# Patient Record
Sex: FEMALE | Race: WHITE | Employment: UNEMPLOYED | URBAN - METROPOLITAN AREA
[De-identification: names, ages, dates, MRNs, and addresses within clinical notes are randomized per-mention and may not be internally consistent; named-entity substitution may affect disease eponyms.]

---

## 2019-08-06 ENCOUNTER — HOSPITAL ENCOUNTER (EMERGENCY)
Age: 44
Discharge: HOME | End: 2019-08-06

## 2019-08-06 VITALS
HEART RATE: 74 BPM | SYSTOLIC BLOOD PRESSURE: 152 MMHG | DIASTOLIC BLOOD PRESSURE: 91 MMHG | OXYGEN SATURATION: 100 % | RESPIRATION RATE: 18 BRPM | TEMPERATURE: 98 F

## 2019-08-06 DIAGNOSIS — H10.31 ACUTE CONJUNCTIVITIS OF RIGHT EYE, UNSPECIFIED ACUTE CONJUNCTIVITIS TYPE: Primary | ICD-10-CM

## 2019-08-06 PROCEDURE — 6370000000 HC RX 637 (ALT 250 FOR IP): Performed by: NURSE PRACTITIONER

## 2019-08-06 PROCEDURE — 99282 EMERGENCY DEPT VISIT SF MDM: CPT

## 2019-08-06 RX ORDER — ERYTHROMYCIN 5 MG/G
OINTMENT OPHTHALMIC ONCE
Status: COMPLETED | OUTPATIENT
Start: 2019-08-06 | End: 2019-08-06

## 2019-08-06 RX ORDER — ERYTHROMYCIN 5 MG/G
OINTMENT OPHTHALMIC
Qty: 1 TUBE | Refills: 0 | Status: SHIPPED | OUTPATIENT
Start: 2019-08-06 | End: 2019-08-16

## 2019-08-06 RX ADMIN — ERYTHROMYCIN: 5 OINTMENT OPHTHALMIC at 20:13

## 2019-08-06 ASSESSMENT — VISUAL ACUITY
OD: 20/50
OS: 20/25
OU: 20/25

## 2019-08-07 ASSESSMENT — ENCOUNTER SYMPTOMS
WHEEZING: 0
EYE ITCHING: 1
BACK PAIN: 0
SHORTNESS OF BREATH: 0
EYE PAIN: 1
ABDOMINAL PAIN: 0
EYE DISCHARGE: 0
PHOTOPHOBIA: 0
DIARRHEA: 0
NAUSEA: 0
VOMITING: 0
COUGH: 0
EYE REDNESS: 1
COLOR CHANGE: 0

## 2019-08-20 ENCOUNTER — HOSPITAL ENCOUNTER (EMERGENCY)
Age: 44
Discharge: HOME | End: 2019-08-20
Attending: EMERGENCY MEDICINE

## 2019-08-20 ENCOUNTER — APPOINTMENT (OUTPATIENT)
Dept: GENERAL RADIOLOGY | Age: 44
End: 2019-08-20

## 2019-08-20 VITALS
HEIGHT: 64 IN | TEMPERATURE: 99.5 F | RESPIRATION RATE: 16 BRPM | HEART RATE: 94 BPM | WEIGHT: 248 LBS | DIASTOLIC BLOOD PRESSURE: 92 MMHG | OXYGEN SATURATION: 96 % | SYSTOLIC BLOOD PRESSURE: 135 MMHG | BODY MASS INDEX: 42.34 KG/M2

## 2019-08-20 DIAGNOSIS — J18.9 PNEUMONIA DUE TO ORGANISM: Primary | ICD-10-CM

## 2019-08-20 LAB
A/G RATIO: 1.2 (ref 1.1–2.2)
ALBUMIN SERPL-MCNC: 4.4 G/DL (ref 3.4–5)
ALP BLD-CCNC: 103 U/L (ref 40–129)
ALT SERPL-CCNC: 13 U/L (ref 10–40)
AMORPHOUS: ABNORMAL /HPF
ANION GAP SERPL CALCULATED.3IONS-SCNC: 11 MMOL/L (ref 3–16)
AST SERPL-CCNC: 15 U/L (ref 15–37)
BASOPHILS ABSOLUTE: 0 K/UL (ref 0–0.2)
BASOPHILS RELATIVE PERCENT: 0.6 %
BILIRUB SERPL-MCNC: <0.2 MG/DL (ref 0–1)
BILIRUBIN URINE: NEGATIVE
BLOOD, URINE: ABNORMAL
BUN BLDV-MCNC: 10 MG/DL (ref 7–20)
CALCIUM SERPL-MCNC: 9.6 MG/DL (ref 8.3–10.6)
CHLORIDE BLD-SCNC: 99 MMOL/L (ref 99–110)
CLARITY: CLEAR
CO2: 26 MMOL/L (ref 21–32)
COLOR: YELLOW
CREAT SERPL-MCNC: 0.7 MG/DL (ref 0.6–1.1)
EOSINOPHILS ABSOLUTE: 0 K/UL (ref 0–0.6)
EOSINOPHILS RELATIVE PERCENT: 0.3 %
EPITHELIAL CELLS, UA: ABNORMAL /HPF
GFR AFRICAN AMERICAN: >60
GFR NON-AFRICAN AMERICAN: >60
GLOBULIN: 3.7 G/DL
GLUCOSE BLD-MCNC: 106 MG/DL (ref 70–99)
GLUCOSE URINE: NEGATIVE MG/DL
HCT VFR BLD CALC: 39.2 % (ref 36–48)
HEMOGLOBIN: 13.1 G/DL (ref 12–16)
KETONES, URINE: NEGATIVE MG/DL
LEUKOCYTE ESTERASE, URINE: NEGATIVE
LYMPHOCYTES ABSOLUTE: 1 K/UL (ref 1–5.1)
LYMPHOCYTES RELATIVE PERCENT: 13.6 %
MCH RBC QN AUTO: 28.7 PG (ref 26–34)
MCHC RBC AUTO-ENTMCNC: 33.3 G/DL (ref 31–36)
MCV RBC AUTO: 86.1 FL (ref 80–100)
MICROSCOPIC EXAMINATION: YES
MONOCYTES ABSOLUTE: 0.5 K/UL (ref 0–1.3)
MONOCYTES RELATIVE PERCENT: 6.2 %
NEUTROPHILS ABSOLUTE: 5.9 K/UL (ref 1.7–7.7)
NEUTROPHILS RELATIVE PERCENT: 79.3 %
NITRITE, URINE: NEGATIVE
PDW BLD-RTO: 14.9 % (ref 12.4–15.4)
PH UA: 6.5 (ref 5–8)
PLATELET # BLD: 260 K/UL (ref 135–450)
PMV BLD AUTO: 7.3 FL (ref 5–10.5)
POTASSIUM REFLEX MAGNESIUM: 3.8 MMOL/L (ref 3.5–5.1)
PROTEIN UA: NEGATIVE MG/DL
RAPID INFLUENZA  B AGN: NEGATIVE
RAPID INFLUENZA A AGN: NEGATIVE
RBC # BLD: 4.56 M/UL (ref 4–5.2)
RBC UA: ABNORMAL /HPF (ref 0–2)
SODIUM BLD-SCNC: 136 MMOL/L (ref 136–145)
SPECIFIC GRAVITY UA: <=1.005 (ref 1–1.03)
TOTAL PROTEIN: 8.1 G/DL (ref 6.4–8.2)
URINE TYPE: ABNORMAL
UROBILINOGEN, URINE: 0.2 E.U./DL
WBC # BLD: 7.5 K/UL (ref 4–11)
WBC UA: ABNORMAL /HPF (ref 0–5)

## 2019-08-20 PROCEDURE — 99283 EMERGENCY DEPT VISIT LOW MDM: CPT

## 2019-08-20 PROCEDURE — 87804 INFLUENZA ASSAY W/OPTIC: CPT

## 2019-08-20 PROCEDURE — 81001 URINALYSIS AUTO W/SCOPE: CPT

## 2019-08-20 PROCEDURE — 71046 X-RAY EXAM CHEST 2 VIEWS: CPT

## 2019-08-20 PROCEDURE — 80053 COMPREHEN METABOLIC PANEL: CPT

## 2019-08-20 PROCEDURE — 85025 COMPLETE CBC W/AUTO DIFF WBC: CPT

## 2019-08-20 PROCEDURE — 6370000000 HC RX 637 (ALT 250 FOR IP): Performed by: NURSE PRACTITIONER

## 2019-08-20 RX ORDER — LEVOFLOXACIN 500 MG/1
500 TABLET, FILM COATED ORAL DAILY
Status: DISCONTINUED | OUTPATIENT
Start: 2019-08-20 | End: 2019-08-20 | Stop reason: HOSPADM

## 2019-08-20 RX ORDER — IBUPROFEN 600 MG/1
600 TABLET ORAL ONCE
Status: DISCONTINUED | OUTPATIENT
Start: 2019-08-20 | End: 2019-08-20 | Stop reason: HOSPADM

## 2019-08-20 RX ORDER — LEVOFLOXACIN 500 MG/1
500 TABLET, FILM COATED ORAL DAILY
Status: DISCONTINUED | OUTPATIENT
Start: 2019-08-21 | End: 2019-08-20

## 2019-08-20 RX ORDER — FLUCONAZOLE 150 MG/1
150 TABLET ORAL ONCE
Qty: 1 TABLET | Refills: 1 | Status: SHIPPED | OUTPATIENT
Start: 2019-08-20 | End: 2019-08-20

## 2019-08-20 RX ORDER — LEVOFLOXACIN 500 MG/1
500 TABLET, FILM COATED ORAL DAILY
Qty: 7 TABLET | Refills: 0 | Status: SHIPPED | OUTPATIENT
Start: 2019-08-20 | End: 2019-08-27

## 2019-08-20 RX ORDER — ACETAMINOPHEN 325 MG/1
650 TABLET ORAL ONCE
Status: COMPLETED | OUTPATIENT
Start: 2019-08-20 | End: 2019-08-20

## 2019-08-20 RX ADMIN — ACETAMINOPHEN 650 MG: 325 TABLET ORAL at 19:41

## 2019-08-20 RX ADMIN — LEVOFLOXACIN 500 MG: 500 TABLET, FILM COATED ORAL at 20:58

## 2019-08-20 ASSESSMENT — PAIN SCALES - GENERAL
PAINLEVEL_OUTOF10: 5
PAINLEVEL_OUTOF10: 0

## 2021-01-17 NOTE — ED PROVIDER NOTES
[facility]  DISCHARGE SUMMARY    Patient Name: Jocelyne Lindquist  : 1959  MRN: [id number]    Date of Admission: 2021  3:05 AM  Date of Discharge:  2021  Primary Care Physician: Uzma Duffy PA-C    Consults     Date and Time Order Name Status Description    2021 0746 Inpatient Neurology Consult General Completed           Hospital Course     Presenting Problem:   Seizure (CMS/HCC) [R56.9]  Seizure (CMS/HCC) [R56.9]    Active Hospital Problems    Diagnosis  POA   • Hypocalcemia [E83.51]  Yes   • Vitamin D deficiency [E55.9]  Yes   • Alcohol use [Z72.89]  Yes   • GERD (gastroesophageal reflux disease) [K21.9]  Yes   • Essential hypertension [I10]  Yes   • Mixed hyperlipidemia [E78.2]  Yes   • Tobacco use [Z72.0]  Yes      Resolved Hospital Problems    Diagnosis Date Resolved POA   • **New onset seizure (CMS/HCC) [R56.9] 2021 Yes   • Hypomagnesemia [E83.42] 2021 Yes   • Hypokalemia [E87.6] 2021 Yes          Hospital Course:  Jocelyne Lindquist is a 61 y.o. female with a medical hx significant for hypertension, hyperlipidemia, GERD and tobacco use presented to [facility] for a new onset seizure.     1. New onset seizure   - CT head unrevealing   - MRI brain negative   - EEG negative  - Discussed with Dr. Sharpe. Recommends NO anti-convulsants at this time. Pt advised to abstain from alcohol. [place] driving laws also reviewed with patient and , no driving for 3 months.      2. Hypocalcemia, Vitamin D Deficiency   - Improved  - Pt will be discharged on Calcium + Vitamin D supplement      3. Hypertension  - continue metoprolol      4. Hyperlipidemia  - continue statin     5. GERD  - continue PPI     6. Asthma / COPD  - prn albuterol inhaler     7. Alcohol use  - ethanol WNLon admission  - admits to drinking 2 beers most days   - Pt advised to abstain from alcohol to prevent further seizures      8. Marijuana use  - patient denies    - UDS positive for THC     9. Tobacco use   - 1-2 cigarettes per day   - smoking cessation education provided     10. Hypokalemia, Hypomagnesemia  - Replaced and normalized  - Rx for supplements on DC     Discharge Follow Up Recommendations for outpatient labs/diagnostics:  -PCP Uzma Duffy PA-C in 1 week with repeat BMP, Mg, and Ionized Ca  -[facility] as needed    Day of Discharge     HPI:   Pt seen and examined. RN notes reviewed. No acute events overnight. Pt ate breakfast this morning, states it's the first whole meal she has eaten in a while. No further seizure activity since admission. We discussed her electrolyte abnormalities.     Review of Systems  Gen- No fevers, chills  CV- No chest pain, palpitations  Resp- No cough, dyspnea  GI- No N/V/D, abd pain    Vital Signs:   Temp:  [97.9 °F (36.6 °C)-98.8 °F (37.1 °C)] 98.8 °F (37.1 °C)  Heart Rate:  [67-84] 82  Resp:  [18] 18  BP: (119-139)/(62-76) 119/76     Physical Exam:  Constitutional: No acute distress, awake, alert  HENT: NCAT, mucous membranes moist  Respiratory: Clear to auscultation bilaterally, respiratory effort normal   Cardiovascular: RRR, no murmurs, rubs, or gallops  Gastrointestinal: Positive bowel sounds, soft, nontender, nondistended  Musculoskeletal: No bilateral ankle edema  Psychiatric: Appropriate affect, cooperative  Neurologic: Oriented x 3, no focal deficits, speech clear  Skin: No rashes    Pertinent  and/or Most Recent Results     LAB RESULTS:      Lab 01/17/21  0638 01/16/21  1051 01/16/21  0409   WBC 8.68 8.32 10.47   HEMOGLOBIN 11.7* 11.5* 11.7*   HEMATOCRIT 36.6 34.5 35.3   PLATELETS 430 444 438   NEUTROS ABS  --  6.24 8.63*   IMMATURE GRANS (ABS)  --  0.05 0.07*   LYMPHS ABS  --  1.50 1.10   MONOS ABS  --  0.47 0.54   EOS ABS  --  0.03 0.08   MCV 92.0 91.0 89.8         Lab 01/17/21  0927 01/16/21  2100 01/16/21  1457 01/16/21  1051 01/16/21  0409   SODIUM 135*  --   --  141 142   POTASSIUM 3.7 3.7  --  2.9* 3.5  occurring since earlier this afternoon. States that she has redness to her eye, itching but no discharge. She does report that she wore mascara yesterday and slept in it. She denies wearing contact lenses. She does report her vision seems blurry in the right eye but no loss of vision or pain within her eye. Respiratory: Negative for cough, shortness of breath and wheezing. Cardiovascular: Negative for chest pain. Gastrointestinal: Negative for abdominal pain, diarrhea, nausea and vomiting. Genitourinary: Negative for difficulty urinating and dysuria. Musculoskeletal: Negative for back pain. Skin: Negative for color change. Neurological: Negative for weakness, numbness and headaches. Positives and Pertinent negatives as per HPI. Except as noted above in the ROS, problem specific ROS was completed and is negative. Physical Exam:  Physical Exam   Constitutional: She is oriented to person, place, and time. She appears well-developed and well-nourished. HENT:   Head: Normocephalic. Right Ear: External ear normal.   Left Ear: External ear normal.   Mouth/Throat: Oropharynx is clear and moist.   Eyes: Pupils are equal, round, and reactive to light. Right eye exhibits no discharge. Left eye exhibits no discharge. Pupils are 4 mm round reactive, normal extraocular movement no visible nystagmus. Patient has erythema to the conjunctivae of the right eye. There is no visible drainage. Left eye is unremarkable. No visible foreign bodies. Neck: Normal range of motion. Neck supple. Cardiovascular: Normal rate. Pulmonary/Chest: Effort normal. No respiratory distress. Abdominal: Soft. Musculoskeletal: Normal range of motion. Neurological: She is alert and oriented to person, place, and time. GCS eye subscore is 4. GCS verbal subscore is 5. GCS motor subscore is 6. Skin: Skin is warm. Capillary refill takes less than 2 seconds. She is not diaphoretic. No pallor.    Psychiatric: She   CHLORIDE 102  --   --  103 102   CO2 21.0*  --   --  23.0 27.0   ANION GAP 12.0  --   --  15.0 13.0   BUN 4*  --   --  5* 5*   CREATININE 0.68  --   --  0.62 0.74   GLUCOSE 167*  --   --  91 124*   CALCIUM 6.9*  --   --  7.2* 6.5*   IONIZED CALCIUM  --   --  1.02*  --  0.93*   MAGNESIUM 2.4  --   --   --  0.4*         Lab 01/16/21  0409   TOTAL PROTEIN 6.1   ALBUMIN 3.20*   GLOBULIN 2.9   ALT (SGPT) 12   AST (SGOT) 18   BILIRUBIN 0.3   ALK PHOS 62                     Brief Urine Lab Results  (Last result in the past 365 days)      Color   Clarity   Blood   Leuk Est   Nitrite   Protein   CREAT   Urine HCG        01/16/21 0357 Yellow Cloudy Small (1+) Negative Negative >=300 mg/dL (3+)             Microbiology Results (last 10 days)     Procedure Component Value - Date/Time    COVID PRE-OP / PRE-PROCEDURE SCREENING ORDER (NO ISOLATION) - Swab, Nasopharynx [[id number]]  (Normal) Collected: 01/16/21 0659    Lab Status: Final result Specimen: Swab from Nasopharynx Updated: 01/16/21 0745    Narrative:      The following orders were created for panel order COVID PRE-OP / PRE-PROCEDURE SCREENING ORDER (NO ISOLATION) - Swab, Nasopharynx.  Procedure                               Abnormality         Status                     ---------                               -----------         ------                     COVID-19 and FLU A/B PCR...[[id number]]  Normal              Final result                 Please view results for these tests on the individual orders.    COVID-19 and FLU A/B PCR - Swab, Nasopharynx [[id number]]  (Normal) Collected: 01/16/21 0659    Lab Status: Final result Specimen: Swab from Nasopharynx Updated: 01/16/21 0745     COVID19 Not Detected     Influenza A PCR Not Detected     Influenza B PCR Not Detected    Narrative:      Fact sheet for providers: https://www.fda.gov/media/069441/download    Fact sheet for patients: https://www.fda.gov/media/689500/download    Test performed by PCR.    Urine Culture -  Urine, Urine, Clean Catch [[id number]] Collected: 01/16/21 0357    Lab Status: Final result Specimen: Urine, Clean Catch Updated: 01/17/21 1023     Urine Culture >100,000 CFU/mL Mixed Viola Isolated    Narrative:      Specimen contains mixed organisms of questionable pathogenicity which indicates contamination with commensal viola.  Further identification is unlikely to provide clinically useful information.  Suggest recollection.          Ct Head Without Contrast    Result Date: 1/16/2021  CT Head WO HISTORY: 61-year-old female with new onset seizure in the emergency compartment. TECHNIQUE: Axial unenhanced head CT. Radiation dose reduction techniques included automated exposure control or exposure modulation based on body size. Count of known CT and cardiac nuc med studies performed in previous 12 months: 0. Time of scan: 0445 hours COMPARISON: None. FINDINGS: No intracranial hemorrhage, mass, or infarct. No hydrocephalus or extra-axial fluid collection. There are senescent changes, including volume loss and nonspecific white matter change, but no acute abnormality is seen. The skull base, calvarium, and extracranial soft tissues are normal.     Senescent changes without acute abnormality. Signer Name: Anthony Hilton MD  Signed: 1/16/2021 5:09 AM  Workstation Name: [facility]  [facility]    Mri Brain With & Without Contrast    Result Date: 1/16/2021  MRI Brain WO W INDICATION:  New onset seizure activity and altered mental status. In the emergency Department. Brain MRI: No hemorrhage. No acute stroke. No mass lesion. No significant hydrocephalus. This is a preliminary wet read by Dr. Anthony Hilton at 0637 hours. Final interpretation will be provided by neuroradiology. Please refer to final interpretation for detailed findings and any further recommendations. Final report by neuroradiology:   MRI Brain WO W Comparison: No pertinent prior study Technique: Sagittal, axial and coronal images of  the dictations but occasionally words are mis-transcribed.)    Electronically signed, ROGER Barrera CNP,         ROGER Barrera CNP  08/07/19 0228 the head were obtained using the standard protocol. The patient was given 12 cc of MultiHance. Findings: The diffusion sequence demonstrates no evidence of restricted diffusion to indicate acute infarction. The FLAIR sequence shows the ventricles to be mildly generous in size. Cortical sulci are correspondingly prominent. No extra-axial fluid collections. No significant shift of midline structures. There are scattered areas of FLAIR hyperintensity in the periventricular and subcortical white matter. These are nonspecific but likely represent microvascular disease in this patient. The pituitary gland is within normal limits. The cervicomedullary junction is normal. The 7th and 8th cranial nerve complexes are within normal limits. After the administration of contrast, there is normal enhancement of vascular structures. No abnormal enhancing lesions are seen. Mastoid air cells are clear. Visualized paranasal sinuses are clear. No acute orbital findings.     Impression: 1.  No acute intracranial findings. 2.  No evidence of abnormal enhancement. 3.  Mild cerebral atrophy and scattered areas of FLAIR hyperintensity in the periventricular and subcortical white matter likely representing microvascular disease in this patient. Signer Name: Trace Foreman MD  Signed: 1/16/2021 10:33 AM  Workstation Name: [email or web address]  [facility]    Xr Chest 1 View    Result Date: 1/16/2021   EXAMINATION: XR CHEST, SINGLE VIEW - 01/16/2021  INDICATION: Seizure this morning. Evaluate for aspiration.  COMPARISON: 01/04/2021  FINDINGS: Portable chest reveals cardiac and mediastinal silhouettes within normal limits. Calcified granuloma stable within the right lower lung. Degenerative changes seen within the spine. Pulmonary vascularity is within normal limits. No focal parenchymal opacification present.      No acute cardiopulmonary disease.  DICTATED:   01/16/2021 EDITED/ls :   01/16/2021  This report was finalized on  1/16/2021 1:56 PM by Dr. Amparo Bush MD.                       Plan for Follow-up of Pending Labs/Results: PCP  Pending Labs     Order Current Status    Vitamin D 1,25 Dihydroxy In process        Discharge Details        Discharge Medications      New Medications      Instructions Start Date   calcium carb-cholecalciferol 600-800 MG-UNIT tablet   1 tablet, Oral, 2 Times Daily With Meals      magnesium oxide 400 MG tablet  Commonly known as: MAG-OX   400 mg, Oral, 2 Times Daily      potassium chloride 10 MEQ CR tablet   10 mEq, Oral, 2 Times Daily         Continue These Medications      Instructions Start Date   albuterol sulfate  (90 Base) MCG/ACT inhaler  Commonly known as: PROVENTIL HFA;VENTOLIN HFA;PROAIR HFA   INHALE 2 PUFFS BY MOUTH EVERY 6 HOURS AS NEEDED FOR WHEEZING      cyclobenzaprine 10 MG tablet  Commonly known as: FLEXERIL   10 mg, Oral, 3 Times Daily PRN      metoprolol succinate XL 25 MG 24 hr tablet  Commonly known as: TOPROL-XL   25 mg, Oral, Daily      omeprazole 20 MG capsule  Commonly known as: priLOSEC   Take 1 capsule by mouth once daily      pravastatin 40 MG tablet  Commonly known as: Pravachol   40 mg, Oral, Nightly      triamcinolone 0.1 % ointment  Commonly known as: KENALOG   Topical, 3 Times Daily             Allergies   Allergen Reactions   • Amoxicillin Swelling   • Benazepril Swelling   • Doxycycline Diarrhea   • Eggs Or Egg-Derived Products      Causes cramps   • Fish-Derived Products      Causes severe stomach cramps         Discharge Disposition:  Home or Self Care    Diet:  Hospital:  Diet Order   Procedures   • Diet Regular       Activity:      Restrictions or Other Recommendations:       CODE STATUS:    Code Status and Medical Interventions:   Ordered at: 01/16/21 0531     Level Of Support Discussed With:    Patient     Code Status:    CPR     Medical Interventions (Level of Support Prior to Arrest):    Full       No future appointments.    Additional Instructions  for the Follow-ups that You Need to Schedule     Discharge Follow-up with PCP   As directed       Currently Documented PCP:    Uzma Duffy PA-C    PCP Phone Number:    [phone]     Follow Up Details: 1 week         Discharge Follow-up with Specified Provider: [facility] as needed   As directed      To: [facility] as needed                     Shani Giraldo DO  01/17/21      Time Spent on Discharge:  I spent  45  minutes on this discharge activity which included: face-to-face encounter with the patient, reviewing the data in the system, coordination of the care with the nursing staff as well as consultants, documentation, and entering orders.

## 2021-06-02 ENCOUNTER — HOSPITAL ENCOUNTER (OUTPATIENT)
Age: 46
Setting detail: OBSERVATION
Discharge: HOME | End: 2021-06-04
Attending: EMERGENCY MEDICINE | Admitting: INTERNAL MEDICINE

## 2021-06-02 ENCOUNTER — APPOINTMENT (OUTPATIENT)
Dept: GENERAL RADIOLOGY | Age: 46
End: 2021-06-02

## 2021-06-02 DIAGNOSIS — I10 HYPERTENSION, UNSPECIFIED TYPE: ICD-10-CM

## 2021-06-02 DIAGNOSIS — R07.9 CHEST PAIN, UNSPECIFIED TYPE: Primary | ICD-10-CM

## 2021-06-02 PROBLEM — E66.01 MORBID OBESITY (HCC): Status: ACTIVE | Noted: 2021-06-02

## 2021-06-02 LAB
A/G RATIO: 1.3 (ref 1.1–2.2)
ALBUMIN SERPL-MCNC: 4.3 G/DL (ref 3.4–5)
ALP BLD-CCNC: 93 U/L (ref 40–129)
ALT SERPL-CCNC: 17 U/L (ref 10–40)
ANION GAP SERPL CALCULATED.3IONS-SCNC: 10 MMOL/L (ref 3–16)
AST SERPL-CCNC: 18 U/L (ref 15–37)
BASOPHILS ABSOLUTE: 0.1 K/UL (ref 0–0.2)
BASOPHILS RELATIVE PERCENT: 0.7 %
BILIRUB SERPL-MCNC: <0.2 MG/DL (ref 0–1)
BUN BLDV-MCNC: 9 MG/DL (ref 7–20)
CALCIUM SERPL-MCNC: 10.2 MG/DL (ref 8.3–10.6)
CHLORIDE BLD-SCNC: 100 MMOL/L (ref 99–110)
CO2: 26 MMOL/L (ref 21–32)
CREAT SERPL-MCNC: 0.7 MG/DL (ref 0.6–1.1)
EOSINOPHILS ABSOLUTE: 0.1 K/UL (ref 0–0.6)
EOSINOPHILS RELATIVE PERCENT: 1 %
GFR AFRICAN AMERICAN: >60
GFR NON-AFRICAN AMERICAN: >60
GLOBULIN: 3.4 G/DL
GLUCOSE BLD-MCNC: 99 MG/DL (ref 70–99)
HCG QUALITATIVE: NEGATIVE
HCT VFR BLD CALC: 39.1 % (ref 36–48)
HEMOGLOBIN: 12.9 G/DL (ref 12–16)
LYMPHOCYTES ABSOLUTE: 2.8 K/UL (ref 1–5.1)
LYMPHOCYTES RELATIVE PERCENT: 32.5 %
MCH RBC QN AUTO: 29.2 PG (ref 26–34)
MCHC RBC AUTO-ENTMCNC: 33.1 G/DL (ref 31–36)
MCV RBC AUTO: 88.2 FL (ref 80–100)
MONOCYTES ABSOLUTE: 0.5 K/UL (ref 0–1.3)
MONOCYTES RELATIVE PERCENT: 6.3 %
NEUTROPHILS ABSOLUTE: 5.2 K/UL (ref 1.7–7.7)
NEUTROPHILS RELATIVE PERCENT: 59.5 %
PDW BLD-RTO: 14.6 % (ref 12.4–15.4)
PLATELET # BLD: 297 K/UL (ref 135–450)
PMV BLD AUTO: 8 FL (ref 5–10.5)
POTASSIUM SERPL-SCNC: 3.7 MMOL/L (ref 3.5–5.1)
RBC # BLD: 4.43 M/UL (ref 4–5.2)
SODIUM BLD-SCNC: 136 MMOL/L (ref 136–145)
TOTAL PROTEIN: 7.7 G/DL (ref 6.4–8.2)
TROPONIN: <0.01 NG/ML
WBC # BLD: 8.7 K/UL (ref 4–11)

## 2021-06-02 PROCEDURE — 80053 COMPREHEN METABOLIC PANEL: CPT

## 2021-06-02 PROCEDURE — 84703 CHORIONIC GONADOTROPIN ASSAY: CPT

## 2021-06-02 PROCEDURE — 6370000000 HC RX 637 (ALT 250 FOR IP): Performed by: PHYSICIAN ASSISTANT

## 2021-06-02 PROCEDURE — 99284 EMERGENCY DEPT VISIT MOD MDM: CPT

## 2021-06-02 PROCEDURE — 93005 ELECTROCARDIOGRAM TRACING: CPT | Performed by: EMERGENCY MEDICINE

## 2021-06-02 PROCEDURE — 71046 X-RAY EXAM CHEST 2 VIEWS: CPT

## 2021-06-02 PROCEDURE — 84484 ASSAY OF TROPONIN QUANT: CPT

## 2021-06-02 PROCEDURE — 85025 COMPLETE CBC W/AUTO DIFF WBC: CPT

## 2021-06-02 RX ORDER — ASPIRIN 81 MG/1
324 TABLET, CHEWABLE ORAL ONCE
Status: COMPLETED | OUTPATIENT
Start: 2021-06-02 | End: 2021-06-02

## 2021-06-02 RX ADMIN — ASPIRIN 324 MG: 81 TABLET, CHEWABLE ORAL at 23:04

## 2021-06-02 RX ADMIN — NITROGLYCERIN 1 INCH: 20 OINTMENT TOPICAL at 23:04

## 2021-06-03 ENCOUNTER — APPOINTMENT (OUTPATIENT)
Dept: NUCLEAR MEDICINE | Age: 46
End: 2021-06-03

## 2021-06-03 LAB
ANION GAP SERPL CALCULATED.3IONS-SCNC: 10 MMOL/L (ref 3–16)
BUN BLDV-MCNC: 8 MG/DL (ref 7–20)
CALCIUM SERPL-MCNC: 8.9 MG/DL (ref 8.3–10.6)
CHLORIDE BLD-SCNC: 104 MMOL/L (ref 99–110)
CHOLESTEROL, TOTAL: 196 MG/DL (ref 0–199)
CO2: 24 MMOL/L (ref 21–32)
CREAT SERPL-MCNC: 0.7 MG/DL (ref 0.6–1.1)
EKG ATRIAL RATE: 72 BPM
EKG ATRIAL RATE: 90 BPM
EKG DIAGNOSIS: NORMAL
EKG DIAGNOSIS: NORMAL
EKG P AXIS: 62 DEGREES
EKG P AXIS: 65 DEGREES
EKG P-R INTERVAL: 114 MS
EKG P-R INTERVAL: 118 MS
EKG Q-T INTERVAL: 362 MS
EKG Q-T INTERVAL: 400 MS
EKG QRS DURATION: 70 MS
EKG QRS DURATION: 82 MS
EKG QTC CALCULATION (BAZETT): 438 MS
EKG QTC CALCULATION (BAZETT): 442 MS
EKG R AXIS: 46 DEGREES
EKG R AXIS: 59 DEGREES
EKG T AXIS: 32 DEGREES
EKG T AXIS: 36 DEGREES
EKG VENTRICULAR RATE: 72 BPM
EKG VENTRICULAR RATE: 90 BPM
GFR AFRICAN AMERICAN: >60
GFR NON-AFRICAN AMERICAN: >60
GLUCOSE BLD-MCNC: 102 MG/DL (ref 70–99)
HCT VFR BLD CALC: 36.1 % (ref 36–48)
HDLC SERPL-MCNC: 53 MG/DL (ref 40–60)
HEMOGLOBIN: 12.1 G/DL (ref 12–16)
LDL CHOLESTEROL CALCULATED: 120 MG/DL
LV EF: 60 %
LVEF MODALITY: NORMAL
MCH RBC QN AUTO: 29.5 PG (ref 26–34)
MCHC RBC AUTO-ENTMCNC: 33.5 G/DL (ref 31–36)
MCV RBC AUTO: 87.8 FL (ref 80–100)
PDW BLD-RTO: 14.5 % (ref 12.4–15.4)
PLATELET # BLD: 274 K/UL (ref 135–450)
PMV BLD AUTO: 7.5 FL (ref 5–10.5)
POTASSIUM REFLEX MAGNESIUM: 3.9 MMOL/L (ref 3.5–5.1)
RBC # BLD: 4.11 M/UL (ref 4–5.2)
SODIUM BLD-SCNC: 138 MMOL/L (ref 136–145)
TRIGL SERPL-MCNC: 117 MG/DL (ref 0–150)
TROPONIN: <0.01 NG/ML
TROPONIN: <0.01 NG/ML
VLDLC SERPL CALC-MCNC: 23 MG/DL
WBC # BLD: 7.8 K/UL (ref 4–11)

## 2021-06-03 PROCEDURE — 93010 ELECTROCARDIOGRAM REPORT: CPT | Performed by: INTERNAL MEDICINE

## 2021-06-03 PROCEDURE — 80048 BASIC METABOLIC PNL TOTAL CA: CPT

## 2021-06-03 PROCEDURE — 2580000003 HC RX 258: Performed by: NURSE PRACTITIONER

## 2021-06-03 PROCEDURE — 93017 CV STRESS TEST TRACING ONLY: CPT

## 2021-06-03 PROCEDURE — 85027 COMPLETE CBC AUTOMATED: CPT

## 2021-06-03 PROCEDURE — 3430000000 HC RX DIAGNOSTIC RADIOPHARMACEUTICAL: Performed by: FAMILY MEDICINE

## 2021-06-03 PROCEDURE — 93005 ELECTROCARDIOGRAM TRACING: CPT | Performed by: NURSE PRACTITIONER

## 2021-06-03 PROCEDURE — G0378 HOSPITAL OBSERVATION PER HR: HCPCS

## 2021-06-03 PROCEDURE — A9502 TC99M TETROFOSMIN: HCPCS | Performed by: FAMILY MEDICINE

## 2021-06-03 PROCEDURE — 80061 LIPID PANEL: CPT

## 2021-06-03 PROCEDURE — 6370000000 HC RX 637 (ALT 250 FOR IP): Performed by: NURSE PRACTITIONER

## 2021-06-03 PROCEDURE — 84484 ASSAY OF TROPONIN QUANT: CPT

## 2021-06-03 PROCEDURE — 78452 HT MUSCLE IMAGE SPECT MULT: CPT

## 2021-06-03 RX ORDER — SODIUM CHLORIDE 9 MG/ML
25 INJECTION, SOLUTION INTRAVENOUS PRN
Status: DISCONTINUED | OUTPATIENT
Start: 2021-06-03 | End: 2021-06-04 | Stop reason: HOSPADM

## 2021-06-03 RX ORDER — SODIUM CHLORIDE 9 MG/ML
INJECTION, SOLUTION INTRAVENOUS CONTINUOUS
Status: DISCONTINUED | OUTPATIENT
Start: 2021-06-03 | End: 2021-06-03

## 2021-06-03 RX ORDER — NITROGLYCERIN 0.4 MG/1
0.4 TABLET SUBLINGUAL EVERY 5 MIN PRN
Status: DISCONTINUED | OUTPATIENT
Start: 2021-06-03 | End: 2021-06-04 | Stop reason: HOSPADM

## 2021-06-03 RX ORDER — ACETAMINOPHEN 325 MG/1
650 TABLET ORAL EVERY 6 HOURS PRN
Status: DISCONTINUED | OUTPATIENT
Start: 2021-06-03 | End: 2021-06-04 | Stop reason: HOSPADM

## 2021-06-03 RX ORDER — ONDANSETRON 2 MG/ML
4 INJECTION INTRAMUSCULAR; INTRAVENOUS EVERY 6 HOURS PRN
Status: DISCONTINUED | OUTPATIENT
Start: 2021-06-03 | End: 2021-06-04 | Stop reason: HOSPADM

## 2021-06-03 RX ORDER — SODIUM CHLORIDE 0.9 % (FLUSH) 0.9 %
5-40 SYRINGE (ML) INJECTION PRN
Status: DISCONTINUED | OUTPATIENT
Start: 2021-06-03 | End: 2021-06-04 | Stop reason: HOSPADM

## 2021-06-03 RX ORDER — ASPIRIN 81 MG/1
81 TABLET, CHEWABLE ORAL DAILY
Status: DISCONTINUED | OUTPATIENT
Start: 2021-06-03 | End: 2021-06-04 | Stop reason: HOSPADM

## 2021-06-03 RX ORDER — ACETAMINOPHEN 650 MG/1
650 SUPPOSITORY RECTAL EVERY 6 HOURS PRN
Status: DISCONTINUED | OUTPATIENT
Start: 2021-06-03 | End: 2021-06-04 | Stop reason: HOSPADM

## 2021-06-03 RX ORDER — POLYETHYLENE GLYCOL 3350 17 G/17G
17 POWDER, FOR SOLUTION ORAL DAILY PRN
Status: DISCONTINUED | OUTPATIENT
Start: 2021-06-03 | End: 2021-06-04 | Stop reason: HOSPADM

## 2021-06-03 RX ORDER — ONDANSETRON 4 MG/1
4 TABLET, ORALLY DISINTEGRATING ORAL EVERY 8 HOURS PRN
Status: DISCONTINUED | OUTPATIENT
Start: 2021-06-03 | End: 2021-06-04 | Stop reason: HOSPADM

## 2021-06-03 RX ORDER — ATORVASTATIN CALCIUM 40 MG/1
40 TABLET, FILM COATED ORAL NIGHTLY
Status: DISCONTINUED | OUTPATIENT
Start: 2021-06-03 | End: 2021-06-03

## 2021-06-03 RX ORDER — SODIUM CHLORIDE 0.9 % (FLUSH) 0.9 %
5-40 SYRINGE (ML) INJECTION EVERY 12 HOURS SCHEDULED
Status: DISCONTINUED | OUTPATIENT
Start: 2021-06-03 | End: 2021-06-04 | Stop reason: HOSPADM

## 2021-06-03 RX ADMIN — ACETAMINOPHEN 650 MG: 325 TABLET ORAL at 14:00

## 2021-06-03 RX ADMIN — Medication 10 ML: at 08:46

## 2021-06-03 RX ADMIN — Medication 10 ML: at 20:29

## 2021-06-03 RX ADMIN — TETROFOSMIN 33.2 MILLICURIE: 1.38 INJECTION, POWDER, LYOPHILIZED, FOR SOLUTION INTRAVENOUS at 11:18

## 2021-06-03 RX ADMIN — ASPIRIN 81 MG: 81 TABLET, CHEWABLE ORAL at 08:46

## 2021-06-03 ASSESSMENT — PAIN SCALES - GENERAL
PAINLEVEL_OUTOF10: 0
PAINLEVEL_OUTOF10: 2
PAINLEVEL_OUTOF10: 0

## 2021-06-03 NOTE — ED PROVIDER NOTES
EKG done June 2, 2021 at 2052 shows normal sinus rhythm, rate 90, normal intervals, no STEMI, no prior EKG for comparison     Barrera Lu MD  06/02/21 2134

## 2021-06-03 NOTE — H&P
E-Cigarette/Vaping Use Never User    Start Date     Passive Exposure     Quit Date     Counseling Given     Comments         Family History:      History reviewed. No pertinent family history. REVIEW OF SYSTEMS COMPLETED:   Pertinent positives as noted in the HPI. All other systems reviewed and negative. PHYSICAL EXAM PERFORMED:    BP (!) 173/85   Pulse 87   Temp 97.8 °F (36.6 °C)   Resp 19   Ht 5' 3\" (1.6 m)   Wt 260 lb (117.9 kg)   LMP 05/19/2021   SpO2 98%   BMI 46.06 kg/m²     General appearance:  Pleasant, obese female in no apparent distress, appears stated age and cooperative. HEENT: Pupils equal, round, and reactive to light. Extra ocular muscles intact. Conjunctivae/corneas clear. Neck: Supple, with full range of motion. No jugular venous distention. Trachea midline. Respiratory:  Normal respiratory effort. Clear to auscultation, bilaterally without Rales/Wheezes/Rhonchi. Cardiovascular:  Regular rate and rhythm with normal S1/S2 without murmurs, rubs or gallops. Abdomen: Soft, obese, round non-tender, non-distended with normal bowel sounds. Musculoskeletal:  No clubbing, cyanosis or edema bilaterally. Full range of motion without deformity. Skin: Skin color, texture, turgor normal.  No significant rashes or lesions. Neurologic:  Neurovascularly intact.  Cranial nerves: II-XII intact, grossly non-focal.  Psychiatric:  Alert and oriented, thought content appropriate, normal insight  Capillary Refill: Brisk,3 seconds, normal  Peripheral Pulses: +2 palpable, equal bilaterally     Labs:     Recent Labs     06/02/21 2131   WBC 8.7   HGB 12.9   HCT 39.1        Recent Labs     06/02/21 2131      K 3.7      CO2 26   BUN 9   CREATININE 0.7   CALCIUM 10.2     Recent Labs     06/02/21 2131   AST 18   ALT 17   BILITOT <0.2   ALKPHOS 93     Recent Labs     06/02/21 2131   TROPONINI <0.01     Urinalysis:      Lab Results   Component Value Date    NITRU Negative 08/20/2019 WBCUA 6-10 08/20/2019    RBCUA 3-5 08/20/2019    BLOODU TRACE-INTACT 08/20/2019    SPECGRAV <=1.005 08/20/2019    GLUCOSEU Negative 08/20/2019     Radiology:     CXR: I have reviewed the CXR with the following interpretation: mild bibasiilar subsegmental atelectasis    EKG:  I have reviewed the EKG with the following interpretation: The Ekg interpreted in the absence of a cardiologist shows Normal sinus rhythm, rate 90. Possible Left atrial enlargement. Nonspecific ST and T wave abnormality. Abnormal ECG. No previous ECGs available    XR CHEST (2 VW)   Final Result   Mild bibasilar subsegmental atelectasis. ASSESSMENT:    Active Hospital Problems    Diagnosis Date Noted    Morbid obesity ([facility] [address] [E66.01] 06/02/2021    Chest pain [R07.9] 06/02/2021     PLAN:    Chest pain in setting of no known CAD  -atypical  -serial troponin - initial negative  -EKG w/o ischemic changes  -FLP in am  -NPO after MN  -stress in am  -echo in am  -prn nitro  -tele monitoring  -heart score 2    Morbid obesity  With Body mass index is 46.0 kg/m². Complicating assessment and treatment. Placing patient at risk for multiple co-morbidities as well as early death and contributing to the patient's presentation. Counseled on weight loss    DVT Prophylaxis: Lovenox    Diet: No diet orders on file     Code Status: No Order    PT/OT Eval Status: No indication for need at this time    Dispo - 1-2 days pending clinical improvement     Stella Rose, APRN - CNP    Thank you No primary care provider on file. for the opportunity to be involved in this patient's care.  If you have any questions or concerns please feel free to contact me at [phone].  --------------------------------Dr. Peyton Romero-------------------------------------------

## 2021-06-03 NOTE — PLAN OF CARE
Problem: Pain:  Goal: Pain level will decrease  Description: Pain level will decrease  Outcome: Ongoing  Note: Pt will be satisfied with pain control with nitro and PRN tylenol. Pt uses numeric pain rating scale with reassessments after pain med administration. Will continue to monitor progression throughout shift. Problem: Falls - Risk of:  Goal: Will remain free from falls  Description: Will remain free from falls  Outcome: Ongoing  Note: Pt will remain free from falls throughout hospital stay. Bed in lowest position with wheels locked and side rails 2/4 up. Hourly rounding completed. Patient ambulates safely independently, call light is within reach. Will continue to monitor throughout shift.

## 2021-06-03 NOTE — ED PROVIDER NOTES
I independently examined and evaluated Teressa Gonzalez. All diagnostic, treatment, and disposition decisions were made by myself in conjunction with the DANISHA, Maricarmen Lewis. . For all further details of the patient's emergency department visit, please see their documentation. 53-year-old female presents with left-sided chest pain that began abruptly earlier this evening. She states she is no longer having pain but is concerned because her father  of a heart attack at the age of 48. She denies shortness of breath or nausea. She has never had a cardiac work-up in the past.    Vitals:    21 0045   BP: (!) 148/74   Pulse: 72   Resp: 16   Temp: 97.9 °F (36.6 °C)   SpO2: 97%       On exam here the patient is obese. No edema. Heart rate is regular. No increased work of breathing. Patient's workup appears unremarkable however she does have significant family history. We will admit the patient for further cardiac work-up. XR CHEST (2 VW)   Final Result   Mild bibasilar subsegmental atelectasis.          NM Cardiac Stress Test Nuclear Imaging    (Results Pending)       Results for orders placed or performed during the hospital encounter of 21   CBC Auto Differential   Result Value Ref Range    WBC 8.7 4.0 - 11.0 K/uL    RBC 4.43 4.00 - 5.20 M/uL    Hemoglobin 12.9 12.0 - 16.0 g/dL    Hematocrit 39.1 36.0 - 48.0 %    MCV 88.2 80.0 - 100.0 fL    MCH 29.2 26.0 - 34.0 pg    MCHC 33.1 31.0 - 36.0 g/dL    RDW 14.6 12.4 - 15.4 %    Platelets 527 523 - 888 K/uL    MPV 8.0 5.0 - 10.5 fL    Neutrophils % 59.5 %    Lymphocytes % 32.5 %    Monocytes % 6.3 %    Eosinophils % 1.0 %    Basophils % 0.7 %    Neutrophils Absolute 5.2 1.7 - 7.7 K/uL    Lymphocytes Absolute 2.8 1.0 - 5.1 K/uL    Monocytes Absolute 0.5 0.0 - 1.3 K/uL    Eosinophils Absolute 0.1 0.0 - 0.6 K/uL    Basophils Absolute 0.1 0.0 - 0.2 K/uL   Comprehensive Metabolic Panel   Result Value Ref Range    Sodium 136 136 - 145 mmol/L Potassium 3.7 3.5 - 5.1 mmol/L    Chloride 100 99 - 110 mmol/L    CO2 26 21 - 32 mmol/L    Anion Gap 10 3 - 16    Glucose 99 70 - 99 mg/dL    BUN 9 7 - 20 mg/dL    CREATININE 0.7 0.6 - 1.1 mg/dL    GFR Non-African American >60 >60    GFR African American >60 >60    Calcium 10.2 8.3 - 10.6 mg/dL    Total Protein 7.7 6.4 - 8.2 g/dL    Albumin 4.3 3.4 - 5.0 g/dL    Albumin/Globulin Ratio 1.3 1.1 - 2.2    Total Bilirubin <0.2 0.0 - 1.0 mg/dL    Alkaline Phosphatase 93 40 - 129 U/L    ALT 17 10 - 40 U/L    AST 18 15 - 37 U/L    Globulin 3.4 g/dL   Troponin   Result Value Ref Range    Troponin <0.01 <0.01 ng/mL   HCG Qualitative, Serum   Result Value Ref Range    hCG Qual Negative Detects HCG level >10 MIU/mL   Troponin   Result Value Ref Range    Troponin <0.01 <0.01 ng/mL   EKG 12 Lead   Result Value Ref Range    Ventricular Rate 90 BPM    Atrial Rate 90 BPM    P-R Interval 114 ms    QRS Duration 70 ms    Q-T Interval 362 ms    QTc Calculation (Bazett) 442 ms    P Axis 65 degrees    R Axis 59 degrees    T Axis 36 degrees    Diagnosis       Normal sinus rhythmPossible Left atrial enlargementNonspecific ST and T wave abnormalityAbnormal ECGNo previous ECGs available     EKG Interpretation    Interpreted by emergency department physician    Rhythm: normal sinus   Rate: normal  Axis: normal  Ectopy: none  Conduction: normal  ST Segments: no acute change  T Waves: non specific changes  Q Waves: none    Clinical Impression: no acute changes and non-specific EKG    MD Panchito Horan MD  06/03/21 4930

## 2021-06-03 NOTE — PROGRESS NOTES
Hospitalist Progress Note      PCP: No primary care provider on file. Date of Admission: 2021    Chief Complaint: chest pain    Hospital Course: 54 y.o. female, with PMH of morbid obesity, who presented to [facility] with chest pain. History obtained from the patient and review of EMR. The patient stated around 6 PM she had an abrupt onset of left-sided chest pain. She reported that the pain is substernal and radiates to her left arm. She describes the pain as a sharp, stabbing pain that lasts several seconds. The patient denies any other associated symptoms. She denies any personal history of CAD, but stated she has family history and her father  of an MI at 48. In the emergency room the patient had a negative troponin as well as a nonischemic EKG. She will be admitted for further evaluation and treatment. A stress test and echocardiogram have been ordered for the a.m. Subjective: Chest pain improved this morning. It was sharp yesterday. Completed first part of stress test. Plan for second part tomorrow. Medications:  Reviewed    Infusion Medications    sodium chloride       Scheduled Medications    sodium chloride flush  5-40 mL Intravenous 2 times per day    aspirin  81 mg Oral Daily    enoxaparin  40 mg Subcutaneous Daily     PRN Meds: sodium chloride flush, sodium chloride, ondansetron **OR** ondansetron, acetaminophen **OR** acetaminophen, polyethylene glycol, perflutren lipid microspheres, nitroGLYCERIN      Intake/Output Summary (Last 24 hours) at 6/3/2021 1807  Last data filed at 6/3/2021 1747  Gross per 24 hour   Intake 600 ml   Output 900 ml   Net -300 ml       Physical Exam Performed:    BP (!) 141/97   Pulse 74   Temp 98.8 °F (37.1 °C) (Oral)   Resp 18   Ht 5' 3\" (1.6 m)   Wt 262 lb 1.6 oz (118.9 kg)   LMP 2021   SpO2 97%   BMI 46.43 kg/m²     General appearance: No apparent distress, appears stated age and cooperative.   HEENT: Pupils equal, round, and reactive to light. Conjunctivae/corneas clear. Neck: Supple, with full range of motion. No jugular venous distention. Trachea midline. Respiratory:  Normal respiratory effort. Clear to auscultation, bilaterally without Rales/Wheezes/Rhonchi. Cardiovascular: Regular rate and rhythm with normal S1/S2 without murmurs, rubs or gallops. Abdomen: Soft, non-tender, non-distended with normal bowel sounds. Musculoskeletal: No clubbing, cyanosis or edema bilaterally. Full range of motion without deformity. Skin: Skin color, texture, turgor normal.  No rashes or lesions. Neurologic:  Neurovascularly intact without any focal sensory/motor deficits. Cranial nerves: II-XII intact, grossly non-focal.  Psychiatric: Alert and oriented, thought content appropriate, normal insight  Capillary Refill: Brisk,3 seconds, normal   Peripheral Pulses: +2 palpable, equal bilaterally       Labs:   Recent Labs     06/02/21 2131 06/03/21  0346   WBC 8.7 7.8   HGB 12.9 12.1   HCT 39.1 36.1    274     Recent Labs     06/02/21 2131 06/03/21  0346    138   K 3.7 3.9    104   CO2 26 24   BUN 9 8   CREATININE 0.7 0.7   CALCIUM 10.2 8.9     Recent Labs     06/02/21 2131   AST 18   ALT 17   BILITOT <0.2   ALKPHOS 93     No results for input(s): INR in the last 72 hours. Recent Labs     06/02/21 2131 06/03/21  0113 06/03/21  0346   TROPONINI <0.01 <0.01 <0.01       Urinalysis:      Lab Results   Component Value Date    NITRU Negative 08/20/2019    WBCUA 6-10 08/20/2019    RBCUA 3-5 08/20/2019    BLOODU TRACE-INTACT 08/20/2019    SPECGRAV <=1.005 08/20/2019    GLUCOSEU Negative 08/20/2019       Radiology:  XR CHEST (2 VW)   Final Result   Mild bibasilar subsegmental atelectasis.          NM Cardiac Stress Test Nuclear Imaging    (Results Pending)           Assessment/Plan:    Active Hospital Problems    Diagnosis     Morbid obesity ([facility] [address] [E66.01]     Chest pain [R07.9]      Chest pain in setting of no known CAD  -atypical  -serial troponin - negative  -EKG w/o ischemic changes  -  -NPO after MN  -stress pending  -echo pending  -prn nitro  -tele monitoring     Morbid obesity  With Body mass index is 42.8 kg/m². Complicating assessment and treatment. Placing patient at risk for multiple co-morbidities as well as early death and contributing to the patient's presentation. Counseled on weight loss    DVT Prophylaxis: lovenox  Diet: ADULT DIET; Regular;  No Caffeine  Code Status: Full Code    PT/OT Eval Status: baseline    Dispo - 1-2 days pending Juvenal Corrigan MD

## 2021-06-03 NOTE — ED PROVIDER NOTES
[facility] Emergency Department    CHIEF COMPLAINT  Chest Pain (Patient comes into ED with c/o left sided chest pain that began abruptly. Patient states the pain has subsided since she got here but her father  of an MI at 48. )      [address] [facility]  I have seen and evaluated this patient with my supervising physician, Dr. Kary Mederos. HISTORY OF PRESENT ILLNESS  Sam Hermosillo is a 54 y.o. female who presents to the ED complaining of several hour history of substernal chest pain. Patient brought in by  today for evaluation. Patient reports that pain substernal and appears to be more on the left. She reports that it is a fleeting sharp stabbing pain lasting for several seconds prior to resolving. No obvious aggravating or alleviating factors and pain does not appear to radiate. She reports that over the past several days has had some nonspecific left arm discomfort. No numbness, tingling, weakness of the extremity. No pain with deep breaths. No cough or congestion. No fevers chills. She denies headache, lightheadedness, dizziness or confusion. No neck or jaw pain. No back pain. Denies abdominal pain. No nausea vomiting. No leg pain swelling. She is a non-smoker. Does not seek medical care. Reports history of fatal MI in father at age 48. No other complaints, modifying factors or associated symptoms. Nursing notes reviewed. History reviewed. No pertinent past medical history. Past Surgical History:   Procedure Laterality Date    ECTOPIC PREGNANCY SURGERY       History reviewed. No pertinent family history.   Social History     Socioeconomic History    Marital status:      Spouse name: Not on file    Number of children: Not on file    Years of education: Not on file    Highest education level: Not on file   Occupational History    Not on file   Tobacco Use    Smoking status: Never Smoker    Smokeless tobacco: Never Used   Vaping MIU/mL   EKG 12 Lead   Result Value Ref Range    Ventricular Rate 90 BPM    Atrial Rate 90 BPM    P-R Interval 114 ms    QRS Duration 70 ms    Q-T Interval 362 ms    QTc Calculation (Bazett) 442 ms    P Axis 65 degrees    R Axis 59 degrees    T Axis 36 degrees    Diagnosis       Normal sinus rhythmPossible Left atrial enlargementNonspecific ST and T wave abnormalityAbnormal ECGNo previous ECGs available     I spoke with Dr. Tasha Deras and NP Aaron Castellano. We thoroughly discussed the history, physical exam, laboratory and imaging studies, as well as, emergency department course. Based upon that discussion, we've decided to admit Franci Porras to the hospital for further observation, evaluation and treatment. Final Impression  1. Chest pain, unspecified type    2. Hypertension, unspecified type      Blood pressure (!) 173/85, pulse 87, temperature 97.8 °F (36.6 °C), resp. rate 19, height 5' 3\" (1.6 m), weight 260 lb (117.9 kg), last menstrual period 05/19/2021, SpO2 98 %. DISPOSITION  Patient was admitted to the hospital in stable condition.          [place], [place]  06/03/21 0008

## 2021-06-04 ENCOUNTER — APPOINTMENT (OUTPATIENT)
Dept: NUCLEAR MEDICINE | Age: 46
End: 2021-06-04

## 2021-06-04 VITALS
BODY MASS INDEX: 46.56 KG/M2 | TEMPERATURE: 98.4 F | OXYGEN SATURATION: 98 % | HEIGHT: 63 IN | WEIGHT: 262.8 LBS | RESPIRATION RATE: 20 BRPM | DIASTOLIC BLOOD PRESSURE: 78 MMHG | SYSTOLIC BLOOD PRESSURE: 135 MMHG | HEART RATE: 81 BPM

## 2021-06-04 LAB
ANION GAP SERPL CALCULATED.3IONS-SCNC: 10 MMOL/L (ref 3–16)
BUN BLDV-MCNC: 8 MG/DL (ref 7–20)
CALCIUM SERPL-MCNC: 8.9 MG/DL (ref 8.3–10.6)
CHLORIDE BLD-SCNC: 104 MMOL/L (ref 99–110)
CO2: 22 MMOL/L (ref 21–32)
CREAT SERPL-MCNC: <0.5 MG/DL (ref 0.6–1.1)
GFR AFRICAN AMERICAN: >60
GFR NON-AFRICAN AMERICAN: >60
GLUCOSE BLD-MCNC: 95 MG/DL (ref 70–99)
LV EF: 58 %
LVEF MODALITY: NORMAL
POTASSIUM SERPL-SCNC: 3.9 MMOL/L (ref 3.5–5.1)
SODIUM BLD-SCNC: 136 MMOL/L (ref 136–145)

## 2021-06-04 PROCEDURE — 80048 BASIC METABOLIC PNL TOTAL CA: CPT

## 2021-06-04 PROCEDURE — 3430000000 HC RX DIAGNOSTIC RADIOPHARMACEUTICAL: Performed by: NURSE PRACTITIONER

## 2021-06-04 PROCEDURE — G0378 HOSPITAL OBSERVATION PER HR: HCPCS

## 2021-06-04 PROCEDURE — 36415 COLL VENOUS BLD VENIPUNCTURE: CPT

## 2021-06-04 PROCEDURE — 6370000000 HC RX 637 (ALT 250 FOR IP): Performed by: NURSE PRACTITIONER

## 2021-06-04 PROCEDURE — A9502 TC99M TETROFOSMIN: HCPCS | Performed by: NURSE PRACTITIONER

## 2021-06-04 RX ADMIN — ASPIRIN 81 MG: 81 TABLET, CHEWABLE ORAL at 09:22

## 2021-06-04 RX ADMIN — TETROFOSMIN 32 MILLICURIE: 1.38 INJECTION, POWDER, LYOPHILIZED, FOR SOLUTION INTRAVENOUS at 07:40

## 2021-06-04 ASSESSMENT — PAIN SCALES - GENERAL: PAINLEVEL_OUTOF10: 0

## 2021-06-04 NOTE — PLAN OF CARE
Problem: Pain:  Goal: Pain level will decrease  Description: Pain level will decrease  Outcome: Met This Shift  Pt has denied any chest pain and stated has some back pain but only from laying in bed all day. Pt has not required any pain medication.       Problem: Falls - Risk of:  Goal: Will remain free from falls  Description: Will remain free from falls  Outcome: Met This Shift     Problem: Pain:  Goal: Control of acute pain  Description: Control of acute pain  Outcome: Ongoing  Goal: Control of chronic pain  Description: Control of chronic pain  Outcome: Ongoing

## 2021-06-04 NOTE — DISCHARGE SUMMARY
Hospital Medicine Discharge Summary    Patient ID: Armani Nash      Patient's PCP: No primary care provider on file. Admit Date: 2021     Discharge Date: 2021      Admitting Physician: Jethro Kennedy DO     Discharge Physician: Brenton Arguelles MD     Discharge Diagnoses: Active Hospital Problems    Diagnosis     Morbid obesity ([address] [E66.01]     Chest pain [R07.9]        The patient was seen and examined on day of discharge and this discharge summary is in conjunction with any daily progress note from day of discharge. Hospital Course:     54 y.o. female, with PMH of morbid obesity, who presented to [facility] with chest pain. History obtained from the patient and review of EMR.  The patient stated around 6 PM she had an abrupt onset of left-sided chest pain.  She reported that the pain is substernal and radiates to her left arm.  She describes the pain as a sharp, stabbing pain that lasts several seconds.  The patient denies any other associated symptoms.  She denies any personal history of CAD, but stated she has family history and her father  of an MI at 48.  In the emergency room the patient had a negative troponin as well as a nonischemic EKG.  She will be admitted for further evaluation and treatment.  A stress test and echocardiogram have been ordered for the a.m. Chest pain in setting of no known CAD  -atypical  -serial troponin - negative  -EKG w/o ischemic changes  -  -stress without ischemia  -echo normal  -prn nitro  -tele monitoring     Morbid obesity  With Body mass index is 46.0 kg/m². Complicating assessment and treatment. Placing patient at risk for multiple co-morbidities as well as early death and contributing to the patient's presentation. Counseled on weight loss    Elevated BP with diagnosis of HTN - continue diet and exercise. Follow with a PCP.       Physical Exam Performed:     /78   Pulse 81   Temp 98.4 °F (36.9 °C) (Oral)   Resp 20

## 2021-06-04 NOTE — PLAN OF CARE
Problem: Pain:  Goal: Pain level will decrease  Description: Pain level will decrease  6/4/2021 1353 by Joyce Weaver RN  Outcome: Ongoing  Note: Pt will be satisfied with pain control. Pt uses numeric pain rating scale with reassessments after pain med administration. Will continue to monitor progression throughout shift. Problem: Falls - Risk of:  Goal: Will remain free from falls  Description: Will remain free from falls  6/4/2021 1353 by Joyce Weaver RN  Outcome: Ongoing  Note: Pt will remain free from falls throughout hospital stay. Fall precautions in place, bed alarm on, bed in lowest position with wheels locked and side rails 2/4 up. Room door open and hourly rounding completed. Will continue to monitor throughout shift.

## 2021-06-04 NOTE — PROGRESS NOTES
Patient discharged per order. IV removed with catheter intact, dressing applied and no complications. Tele removed and CMU notified. Discharge instructions given to patient, verbalized understanding. Patient discharged and walked self with all belongings in stable condition to private car.

## 2024-04-24 ENCOUNTER — HOSPITAL ENCOUNTER (EMERGENCY)
Age: 49
Discharge: HOME | End: 2024-04-24

## 2024-04-24 VITALS
DIASTOLIC BLOOD PRESSURE: 88 MMHG | RESPIRATION RATE: 18 BRPM | BODY MASS INDEX: 46.59 KG/M2 | HEART RATE: 76 BPM | WEIGHT: 263 LBS | SYSTOLIC BLOOD PRESSURE: 167 MMHG | TEMPERATURE: 98.9 F | OXYGEN SATURATION: 98 %

## 2024-04-24 DIAGNOSIS — R21 RASH AND OTHER NONSPECIFIC SKIN ERUPTION: Primary | ICD-10-CM

## 2024-04-24 DIAGNOSIS — L30.9 DERMATITIS: ICD-10-CM

## 2024-04-24 PROCEDURE — 99283 EMERGENCY DEPT VISIT LOW MDM: CPT

## 2024-04-24 RX ORDER — DOXYCYCLINE HYCLATE 100 MG
100 TABLET ORAL 2 TIMES DAILY
Qty: 14 TABLET | Refills: 0 | Status: SHIPPED | OUTPATIENT
Start: 2024-04-24 | End: 2024-05-01

## 2024-04-24 RX ORDER — DOXYCYCLINE HYCLATE 100 MG
100 TABLET ORAL 2 TIMES DAILY
Qty: 14 TABLET | Refills: 0 | Status: SHIPPED | OUTPATIENT
Start: 2024-04-24 | End: 2024-04-24 | Stop reason: CLARIF

## 2024-04-24 RX ORDER — TRIAMCINOLONE ACETONIDE 1 MG/G
CREAM TOPICAL
Qty: 30 G | Refills: 0 | Status: SHIPPED | OUTPATIENT
Start: 2024-04-24

## 2024-04-24 RX ORDER — TRIAMCINOLONE ACETONIDE 1 MG/G
CREAM TOPICAL
Qty: 45 G | Refills: 0 | Status: SHIPPED | OUTPATIENT
Start: 2024-04-24 | End: 2024-04-24 | Stop reason: CLARIF

## 2024-04-24 ASSESSMENT — PAIN SCALES - GENERAL: PAINLEVEL_OUTOF10: 0

## 2024-04-24 ASSESSMENT — PAIN - FUNCTIONAL ASSESSMENT: PAIN_FUNCTIONAL_ASSESSMENT: 0-10

## 2024-04-24 NOTE — ED PROVIDER NOTES
[facility]  ED  EMERGENCY DEPARTMENT ENCOUNTER        Pt Name: Patricia Ortez  MRN: [id number]  Birthdate 1975  Date of evaluation: 4/24/2024  Provider: KEIRA MORENO PA-C  PCP: No primary care provider on file.  ED Attending: MD Yvonne      DANISHA. I have evaluated this patient.      CHIEF COMPLAINT:     Chief Complaint   Patient presents with    Rash     Rash on abd, started last Friday, thought she had a mosquito bites and it started to get worse       HISTORY OF PRESENT ILLNESS:      History provided by the patient. No limitations.    Patricia Ortez is a 48 y.o. female who arrives to the ED by private vehicle.  Patient is here with a itchy, erythematous rash to her right abdominal wall that started this past Friday, 4/19.  She states she initially thought it could be a mosquito bite or a spider bite.  Over the course of the last 5 to 6 days the area of redness has spread.  She has not tried anything whatsoever for her symptoms (no topical creams or oral meds like Benadryl).  She is not experiencing any associated pain.  She denies fevers or chills.  She states her  has had cellulitis in the past and because the redness is worsening, she is here concerned about this.  She has not observed any insect bites and is unaware of anything topical that could have caused the rash.    Nursing Notes were reviewed     REVIEW OF SYSTEMS:     Review of Systems  Positives and pertinent negatives as per HPI.      PAST MEDICAL HISTORY:   No past medical history on file.    SURGICAL HISTORY:      Past Surgical History:   Procedure Laterality Date    ECTOPIC PREGNANCY SURGERY         CURRENT MEDICATIONS:       Discharge Medication List as of 4/24/2024 10:41 AM          ALLERGIES:    Keflex [cephalexin]    FAMILY HISTORY:     @[employer]    SOCIAL HISTORY:       Social History     Socioeconomic History    Marital status:    Tobacco Use    Smoking status: Never    Smokeless tobacco: Never   Vaping

## 2024-04-29 ENCOUNTER — OFFICE VISIT (OUTPATIENT)
Dept: PRIMARY CARE CLINIC | Age: 49
End: 2024-04-29

## 2024-04-29 VITALS
DIASTOLIC BLOOD PRESSURE: 88 MMHG | HEIGHT: 63 IN | OXYGEN SATURATION: 97 % | BODY MASS INDEX: 47.13 KG/M2 | HEART RATE: 88 BPM | WEIGHT: 266 LBS | SYSTOLIC BLOOD PRESSURE: 134 MMHG | TEMPERATURE: 98.3 F

## 2024-04-29 DIAGNOSIS — R21 RASH: ICD-10-CM

## 2024-04-29 DIAGNOSIS — I10 HYPERTENSION, UNSPECIFIED TYPE: Primary | ICD-10-CM

## 2024-04-29 PROCEDURE — 3075F SYST BP GE 130 - 139MM HG: CPT

## 2024-04-29 PROCEDURE — 99203 OFFICE O/P NEW LOW 30 MIN: CPT

## 2024-04-29 PROCEDURE — 3079F DIAST BP 80-89 MM HG: CPT

## 2024-04-29 RX ORDER — NORGESTIMATE AND ETHINYL ESTRADIOL 0.25-0.035
1 KIT ORAL DAILY
COMMUNITY
Start: 2023-12-06

## 2024-04-29 RX ORDER — LOSARTAN POTASSIUM 100 MG/1
100 TABLET ORAL DAILY
Qty: 90 TABLET | Refills: 1 | Status: SHIPPED | OUTPATIENT
Start: 2024-04-29

## 2024-04-29 RX ORDER — FLUOROURACIL 50 MG/G
CREAM TOPICAL 2 TIMES DAILY
COMMUNITY
Start: 2022-01-10

## 2024-04-29 RX ORDER — LOSARTAN POTASSIUM 100 MG/1
100 TABLET ORAL DAILY
COMMUNITY
Start: 2023-08-30 | End: 2024-04-29 | Stop reason: SDUPTHER

## 2024-04-29 SDOH — ECONOMIC STABILITY: HOUSING INSECURITY
IN THE LAST 12 MONTHS, WAS THERE A TIME WHEN YOU DID NOT HAVE A STEADY PLACE TO SLEEP OR SLEPT IN A SHELTER (INCLUDING NOW)?: NO

## 2024-04-29 SDOH — ECONOMIC STABILITY: FOOD INSECURITY: WITHIN THE PAST 12 MONTHS, THE FOOD YOU BOUGHT JUST DIDN'T LAST AND YOU DIDN'T HAVE MONEY TO GET MORE.: NEVER TRUE

## 2024-04-29 SDOH — ECONOMIC STABILITY: FOOD INSECURITY: WITHIN THE PAST 12 MONTHS, YOU WORRIED THAT YOUR FOOD WOULD RUN OUT BEFORE YOU GOT MONEY TO BUY MORE.: NEVER TRUE

## 2024-04-29 SDOH — ECONOMIC STABILITY: INCOME INSECURITY: HOW HARD IS IT FOR YOU TO PAY FOR THE VERY BASICS LIKE FOOD, HOUSING, MEDICAL CARE, AND HEATING?: NOT HARD AT ALL

## 2024-04-29 ASSESSMENT — PATIENT HEALTH QUESTIONNAIRE - PHQ9
2. FEELING DOWN, DEPRESSED OR HOPELESS: NOT AT ALL
SUM OF ALL RESPONSES TO PHQ QUESTIONS 1-9: 0
SUM OF ALL RESPONSES TO PHQ9 QUESTIONS 1 & 2: 0
SUM OF ALL RESPONSES TO PHQ QUESTIONS 1-9: 0
SUM OF ALL RESPONSES TO PHQ QUESTIONS 1-9: 0
1. LITTLE INTEREST OR PLEASURE IN DOING THINGS: NOT AT ALL
SUM OF ALL RESPONSES TO PHQ QUESTIONS 1-9: 0

## 2024-04-29 NOTE — PROGRESS NOTES
PROGRESS NOTE   [facility]                                  [address], [address], [facility] [address]         Phone: [phone]    Date of Service:  4/29/2024     Patient ID: Toya Ortez is a 48 y.o. female      Subjective:     CC: Hospital follow-up and establish care    HPI  PMH of HTN     Concerns for herpes zoster like rash  Patient rash started with 1-2 macules on the abdomen on April 19 that slowly spread across her abdomen towards her back.      Patient presented emergency department on 4/24/2024 for pruritic rash on the abdomen.  Patient endorsed increased temperature of the affected area which prompted her to go to emergency department.  No definitive diagnosis made in the emergency department, but patient was given doxycycline and steroid cream.  Patient did not have any associated fevers, or chills.      Patient has tried over-the-counter antifungal, over-the-counter steroid, and Benadryl with no resolution of her symptoms.  Patient is mainly dealing with pruritus and tingling.  Patient has been using steroid cream over the weekend which initially worsened her symptoms but has since been able to alleviate some of the redness of her rash.    Patient still has no associated fever or chills    Patient does note some watery discharge following scratching of her rash    Hypertension  Patient has been taking losartan for 2 to 3 years now with recent increase in dosage of 50 mg to 100 mg.  Patient has been on losartan hydrochlorothiazide in the past has not tolerated.    Preventative  Patient is due for her preventative care of lipids, A1c, and Cologuard.           ROS: Negative or otherwise noted in the HPI      Vitals:    04/29/24 1304   BP: 134/88   Site: Right Upper Arm   Position: Sitting   Cuff Size: Large Adult   Pulse: 88   Temp: 98.3 °F (36.8 °C)   TempSrc: Oral   SpO2: 97%   Weight: 120.7 kg (266 lb)   Height:

## 2024-11-04 ENCOUNTER — OFFICE VISIT (OUTPATIENT)
Dept: INTERNAL MEDICINE CLINIC | Age: 49
End: 2024-11-04

## 2024-11-04 VITALS
HEIGHT: 62 IN | SYSTOLIC BLOOD PRESSURE: 139 MMHG | OXYGEN SATURATION: 98 % | HEART RATE: 88 BPM | BODY MASS INDEX: 48.95 KG/M2 | DIASTOLIC BLOOD PRESSURE: 83 MMHG | WEIGHT: 266 LBS

## 2024-11-04 DIAGNOSIS — N95.1 VASOMOTOR SYMPTOMS DUE TO MENOPAUSE: ICD-10-CM

## 2024-11-04 DIAGNOSIS — I10 PRIMARY HYPERTENSION: ICD-10-CM

## 2024-11-04 DIAGNOSIS — R06.02 SOB (SHORTNESS OF BREATH): Primary | ICD-10-CM

## 2024-11-04 DIAGNOSIS — J45.20 MILD INTERMITTENT ASTHMA WITHOUT COMPLICATION: ICD-10-CM

## 2024-11-04 LAB
EXPIRATORY TIME: NORMAL
FEF 25-75% %PRED-PRE: NORMAL
FEF 25-75% PRED: NORMAL
FEF 25-75-PRE: NORMAL
FEV1 %PRED-PRE: NORMAL
FEV1 PRED: NORMAL
FEV1/FVC %PRED-PRE: NORMAL
FEV1/FVC PRED: NORMAL
FEV1/FVC: NORMAL
FEV1: NORMAL
FVC %PRED-PRE: NORMAL
FVC PRED: NORMAL
FVC: NORMAL
PEF %PRED-PRE: NORMAL
PEF PRED: NORMAL
PEF-PRE: NORMAL

## 2024-11-04 PROCEDURE — 3079F DIAST BP 80-89 MM HG: CPT | Performed by: INTERNAL MEDICINE

## 2024-11-04 PROCEDURE — 3075F SYST BP GE 130 - 139MM HG: CPT | Performed by: INTERNAL MEDICINE

## 2024-11-04 PROCEDURE — 99203 OFFICE O/P NEW LOW 30 MIN: CPT | Performed by: INTERNAL MEDICINE

## 2024-11-04 RX ORDER — NORGESTIMATE AND ETHINYL ESTRADIOL 0.25-0.035
1 KIT ORAL DAILY
COMMUNITY

## 2024-11-04 RX ORDER — LOSARTAN POTASSIUM 100 MG/1
100 TABLET ORAL DAILY
Qty: 90 TABLET | Refills: 2 | Status: SHIPPED | OUTPATIENT
Start: 2024-11-04

## 2024-11-04 RX ORDER — LOSARTAN POTASSIUM 100 MG/1
100 TABLET ORAL DAILY
Qty: 90 TABLET | Refills: 1 | Status: CANCELLED | OUTPATIENT
Start: 2024-11-04

## 2024-11-04 NOTE — PROGRESS NOTES
would be venlafaxine (Effexor).    Perimenopausal menorrhagia on SPRINTEC 28 prescribed by OB/Gyn.        Follow-up as needed or two months

## 2024-11-04 NOTE — PATIENT INSTRUCTIONS
Primary hypertension:  Blood pressure borderline today at 139/83, goal less than 130/80.   Continue taking losartan 100 mg daily.  Possible asthma:  Spirometry is NORMAL today with FEV1 2.67 (102% predicted).    Vasomotor symptoms (hot flashes) of menopause with night sweats.  You can try black cohosh three times per day.  Another option would be venlafaxine (Effexor).    Perimenopausal menorrhagia on SPRINTEC 28 prescribed by OB/Gyn.        Follow-up as needed or two months

## 2024-11-05 ENCOUNTER — CLINICAL DOCUMENTATION (OUTPATIENT)
Facility: HOSPITAL | Age: 49
End: 2024-11-05

## 2024-11-05 NOTE — PROGRESS NOTES
Reviewed chart, spoke with patient concerning obtaining coverage.  She did state that she is going to the Marketplace and get coverage, she has done it before.  But was almost in the middle of an emergency and asked to call me back to tomorrow.  I advised that was perfectly fine.     Margaret Coulter   Financial Navigator

## 2024-11-12 ENCOUNTER — OFFICE VISIT (OUTPATIENT)
Dept: INTERNAL MEDICINE CLINIC | Age: 49
End: 2024-11-12

## 2024-11-12 VITALS
SYSTOLIC BLOOD PRESSURE: 134 MMHG | OXYGEN SATURATION: 98 % | WEIGHT: 269.8 LBS | HEIGHT: 62 IN | HEART RATE: 89 BPM | DIASTOLIC BLOOD PRESSURE: 84 MMHG | BODY MASS INDEX: 49.65 KG/M2
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
## 2024-11-12 DIAGNOSIS — S13.4XXA NECK PAIN WITH NECK STIFFNESS AFTER WHIPLASH INJURY TO NECK: Primary | ICD-10-CM

## 2024-11-12 DIAGNOSIS — G44.209 ACUTE NON INTRACTABLE TENSION-TYPE HEADACHE: ICD-10-CM

## 2024-11-12 PROBLEM — R51.9 ACUTE HEADACHE: Status: ACTIVE | Noted: 2024-11-12

## 2024-11-12 RX ORDER — IBUPROFEN 200 MG
400 TABLET ORAL EVERY 8 HOURS PRN
COMMUNITY
Start: 2024-11-12

## 2024-11-12 RX ORDER — CYCLOBENZAPRINE HCL 10 MG
10 TABLET ORAL 3 TIMES DAILY PRN
Qty: 30 TABLET | Refills: 0 | Status: SHIPPED | OUTPATIENT
Start: 2024-11-12 | End: 2024-11-22

## 2024-11-12 NOTE — PROGRESS NOTES
\"ETOH\"  No results found for: \"DDIMER\"  No results found for: \"VITD25\"      RADIOLOGY:  No results found.            Electronically signed by Angelina Ji MD on 11/12/2024 at 4:05 PM